# Patient Record
Sex: FEMALE | Race: WHITE | NOT HISPANIC OR LATINO | Employment: UNEMPLOYED | URBAN - METROPOLITAN AREA
[De-identification: names, ages, dates, MRNs, and addresses within clinical notes are randomized per-mention and may not be internally consistent; named-entity substitution may affect disease eponyms.]

---

## 2021-04-12 ENCOUNTER — HOSPITAL ENCOUNTER (EMERGENCY)
Facility: HOSPITAL | Age: 46
Discharge: HOME | End: 2021-04-12
Attending: EMERGENCY MEDICINE

## 2021-04-12 VITALS
RESPIRATION RATE: 20 BRPM | BODY MASS INDEX: 22.5 KG/M2 | TEMPERATURE: 98 F | OXYGEN SATURATION: 100 % | SYSTOLIC BLOOD PRESSURE: 121 MMHG | HEART RATE: 91 BPM | DIASTOLIC BLOOD PRESSURE: 60 MMHG | WEIGHT: 140 LBS | HEIGHT: 66 IN

## 2021-04-12 DIAGNOSIS — Z77.098 EXPOSURE TO CHEMICAL INHALATION: Primary | ICD-10-CM

## 2021-04-12 DIAGNOSIS — F43.0 STRESS REACTION: ICD-10-CM

## 2021-04-12 DIAGNOSIS — R00.0 TACHYCARDIA: ICD-10-CM

## 2021-04-12 LAB
ALBUMIN SERPL BCP-MCNC: 4 G/DL (ref 3.5–5.2)
ALP SERPL-CCNC: 69 U/L (ref 55–135)
ALT SERPL W/O P-5'-P-CCNC: 21 U/L (ref 10–44)
AMPHET+METHAMPHET UR QL: NORMAL
ANION GAP SERPL CALC-SCNC: 11 MMOL/L (ref 8–16)
AST SERPL-CCNC: 20 U/L (ref 10–40)
BARBITURATES UR QL SCN>200 NG/ML: NEGATIVE
BASOPHILS # BLD AUTO: 0.02 K/UL (ref 0–0.2)
BASOPHILS NFR BLD: 0.3 % (ref 0–1.9)
BENZODIAZ UR QL SCN>200 NG/ML: NEGATIVE
BILIRUB SERPL-MCNC: 0.4 MG/DL (ref 0.1–1)
BUN SERPL-MCNC: 12 MG/DL (ref 6–20)
BZE UR QL SCN: NEGATIVE
CALCIUM SERPL-MCNC: 9.7 MG/DL (ref 8.7–10.5)
CANNABINOIDS UR QL SCN: NEGATIVE
CARBOXYHEMOGLOBIN: 3.9 % (ref 1.5–5)
CHLORIDE SERPL-SCNC: 106 MMOL/L (ref 95–110)
CO2 SERPL-SCNC: 26 MMOL/L (ref 23–29)
CREAT SERPL-MCNC: 0.8 MG/DL (ref 0.5–1.4)
CREAT UR-MCNC: 151 MG/DL (ref 15–325)
DIFFERENTIAL METHOD: ABNORMAL
EOSINOPHIL # BLD AUTO: 0.2 K/UL (ref 0–0.5)
EOSINOPHIL NFR BLD: 2.6 % (ref 0–8)
ERYTHROCYTE [DISTWIDTH] IN BLOOD BY AUTOMATED COUNT: 12.9 % (ref 11.5–14.5)
EST. GFR  (AFRICAN AMERICAN): >60 ML/MIN/1.73 M^2
EST. GFR  (NON AFRICAN AMERICAN): >60 ML/MIN/1.73 M^2
GLUCOSE SERPL-MCNC: 99 MG/DL (ref 70–110)
HCT VFR BLD AUTO: 42.2 % (ref 37–48.5)
HGB BLD-MCNC: 13.5 G/DL (ref 12–16)
IMM GRANULOCYTES # BLD AUTO: 0.02 K/UL (ref 0–0.04)
IMM GRANULOCYTES NFR BLD AUTO: 0.3 % (ref 0–0.5)
LYMPHOCYTES # BLD AUTO: 1 K/UL (ref 1–4.8)
LYMPHOCYTES NFR BLD: 14 % (ref 18–48)
MCH RBC QN AUTO: 29.3 PG (ref 27–31)
MCHC RBC AUTO-ENTMCNC: 32 G/DL (ref 32–36)
MCV RBC AUTO: 92 FL (ref 82–98)
METHADONE UR QL SCN>300 NG/ML: NEGATIVE
MONOCYTES # BLD AUTO: 0.3 K/UL (ref 0.3–1)
MONOCYTES NFR BLD: 5 % (ref 4–15)
NEUTROPHILS # BLD AUTO: 5.3 K/UL (ref 1.8–7.7)
NEUTROPHILS NFR BLD: 77.8 % (ref 38–73)
NRBC BLD-RTO: 0 /100 WBC
OPIATES UR QL SCN: NEGATIVE
PCP UR QL SCN>25 NG/ML: NEGATIVE
PLATELET # BLD AUTO: 270 K/UL (ref 150–450)
PMV BLD AUTO: 10.3 FL (ref 9.2–12.9)
POTASSIUM SERPL-SCNC: 3.7 MMOL/L (ref 3.5–5.1)
PROT SERPL-MCNC: 7.5 G/DL (ref 6–8.4)
RBC # BLD AUTO: 4.61 M/UL (ref 4–5.4)
SODIUM SERPL-SCNC: 143 MMOL/L (ref 136–145)
TOXICOLOGY INFORMATION: NORMAL
TSH SERPL DL<=0.005 MIU/L-ACNC: 0.86 UIU/ML (ref 0.4–4)
WBC # BLD AUTO: 6.86 K/UL (ref 3.9–12.7)

## 2021-04-12 PROCEDURE — 99284 EMERGENCY DEPT VISIT MOD MDM: CPT | Mod: 25

## 2021-04-12 PROCEDURE — 36415 COLL VENOUS BLD VENIPUNCTURE: CPT | Performed by: EMERGENCY MEDICINE

## 2021-04-12 PROCEDURE — 85025 COMPLETE CBC W/AUTO DIFF WBC: CPT | Performed by: EMERGENCY MEDICINE

## 2021-04-12 PROCEDURE — 93010 EKG 12-LEAD: ICD-10-PCS | Mod: ,,, | Performed by: INTERNAL MEDICINE

## 2021-04-12 PROCEDURE — 93005 ELECTROCARDIOGRAM TRACING: CPT

## 2021-04-12 PROCEDURE — 99900035 HC TECH TIME PER 15 MIN (STAT)

## 2021-04-12 PROCEDURE — 93010 ELECTROCARDIOGRAM REPORT: CPT | Mod: ,,, | Performed by: INTERNAL MEDICINE

## 2021-04-12 PROCEDURE — 84443 ASSAY THYROID STIM HORMONE: CPT | Performed by: EMERGENCY MEDICINE

## 2021-04-12 PROCEDURE — 80307 DRUG TEST PRSMV CHEM ANLYZR: CPT | Performed by: EMERGENCY MEDICINE

## 2021-04-12 PROCEDURE — 82375 ASSAY CARBOXYHB QUANT: CPT

## 2021-04-12 PROCEDURE — 36600 WITHDRAWAL OF ARTERIAL BLOOD: CPT

## 2021-04-12 PROCEDURE — 80053 COMPREHEN METABOLIC PANEL: CPT | Performed by: EMERGENCY MEDICINE

## 2022-12-03 ENCOUNTER — OFFICE VISIT (OUTPATIENT)
Dept: URGENT CARE | Facility: CLINIC | Age: 47
End: 2022-12-03
Payer: MEDICAID

## 2022-12-03 VITALS
RESPIRATION RATE: 20 BRPM | HEIGHT: 66 IN | DIASTOLIC BLOOD PRESSURE: 59 MMHG | BODY MASS INDEX: 28.34 KG/M2 | TEMPERATURE: 98 F | SYSTOLIC BLOOD PRESSURE: 89 MMHG | OXYGEN SATURATION: 97 % | HEART RATE: 85 BPM | WEIGHT: 176.38 LBS

## 2022-12-03 DIAGNOSIS — N89.8 VAGINAL DISCHARGE: ICD-10-CM

## 2022-12-03 DIAGNOSIS — R30.0 DYSURIA: ICD-10-CM

## 2022-12-03 DIAGNOSIS — R35.0 FREQUENCY OF URINATION: Primary | ICD-10-CM

## 2022-12-03 LAB
BILIRUB UR QL STRIP: NEGATIVE
GLUCOSE UR QL STRIP: NEGATIVE
KETONES UR QL STRIP: NEGATIVE
LEUKOCYTE ESTERASE UR QL STRIP: POSITIVE
PH, POC UA: 6
POC BLOOD, URINE: POSITIVE
POC NITRATES, URINE: NEGATIVE
PROT UR QL STRIP: POSITIVE
SP GR UR STRIP: 1.01 (ref 1–1.03)
UROBILINOGEN UR STRIP-ACNC: 4 (ref 0.1–1.1)

## 2022-12-03 PROCEDURE — 1160F PR REVIEW ALL MEDS BY PRESCRIBER/CLIN PHARMACIST DOCUMENTED: ICD-10-PCS | Mod: CPTII,S$GLB,, | Performed by: NURSE PRACTITIONER

## 2022-12-03 PROCEDURE — 99204 PR OFFICE/OUTPT VISIT, NEW, LEVL IV, 45-59 MIN: ICD-10-PCS | Mod: S$GLB,,, | Performed by: NURSE PRACTITIONER

## 2022-12-03 PROCEDURE — 81003 URINALYSIS AUTO W/O SCOPE: CPT | Mod: QW,S$GLB,, | Performed by: NURSE PRACTITIONER

## 2022-12-03 PROCEDURE — 3008F BODY MASS INDEX DOCD: CPT | Mod: CPTII,S$GLB,, | Performed by: NURSE PRACTITIONER

## 2022-12-03 PROCEDURE — 1160F RVW MEDS BY RX/DR IN RCRD: CPT | Mod: CPTII,S$GLB,, | Performed by: NURSE PRACTITIONER

## 2022-12-03 PROCEDURE — 3078F PR MOST RECENT DIASTOLIC BLOOD PRESSURE < 80 MM HG: ICD-10-PCS | Mod: CPTII,S$GLB,, | Performed by: NURSE PRACTITIONER

## 2022-12-03 PROCEDURE — 3078F DIAST BP <80 MM HG: CPT | Mod: CPTII,S$GLB,, | Performed by: NURSE PRACTITIONER

## 2022-12-03 PROCEDURE — 1159F PR MEDICATION LIST DOCUMENTED IN MEDICAL RECORD: ICD-10-PCS | Mod: CPTII,S$GLB,, | Performed by: NURSE PRACTITIONER

## 2022-12-03 PROCEDURE — 1159F MED LIST DOCD IN RCRD: CPT | Mod: CPTII,S$GLB,, | Performed by: NURSE PRACTITIONER

## 2022-12-03 PROCEDURE — 3008F PR BODY MASS INDEX (BMI) DOCUMENTED: ICD-10-PCS | Mod: CPTII,S$GLB,, | Performed by: NURSE PRACTITIONER

## 2022-12-03 PROCEDURE — 99204 OFFICE O/P NEW MOD 45 MIN: CPT | Mod: S$GLB,,, | Performed by: NURSE PRACTITIONER

## 2022-12-03 PROCEDURE — 3074F SYST BP LT 130 MM HG: CPT | Mod: CPTII,S$GLB,, | Performed by: NURSE PRACTITIONER

## 2022-12-03 PROCEDURE — 81003 POCT URINALYSIS, DIPSTICK, AUTOMATED, W/O SCOPE: ICD-10-PCS | Mod: QW,S$GLB,, | Performed by: NURSE PRACTITIONER

## 2022-12-03 PROCEDURE — 3074F PR MOST RECENT SYSTOLIC BLOOD PRESSURE < 130 MM HG: ICD-10-PCS | Mod: CPTII,S$GLB,, | Performed by: NURSE PRACTITIONER

## 2022-12-03 RX ORDER — CEPHALEXIN 500 MG/1
500 CAPSULE ORAL EVERY 12 HOURS
Qty: 14 CAPSULE | Refills: 0 | Status: SHIPPED | OUTPATIENT
Start: 2022-12-03 | End: 2022-12-10

## 2022-12-03 RX ORDER — METRONIDAZOLE 7.5 MG/G
1 GEL VAGINAL 2 TIMES DAILY
Qty: 70 G | Refills: 0 | Status: SHIPPED | OUTPATIENT
Start: 2022-12-03 | End: 2022-12-08

## 2022-12-03 RX ORDER — FLUCONAZOLE 100 MG/1
100 TABLET ORAL EVERY OTHER DAY
Qty: 3 TABLET | Refills: 0 | Status: SHIPPED | OUTPATIENT
Start: 2022-12-03

## 2022-12-03 NOTE — PATIENT INSTRUCTIONS
Cephalexin 500mg twice daily x 7 days  Fluconazole 100mg every other day  Montronidazole gel Place 1 applicator vaginally twice daily x 7 days  A Nuswab was collected and sent to the lab. We will call you with the results  Follow up if your symptoms persist or worsen

## 2022-12-03 NOTE — PROGRESS NOTES
"Subjective:       Patient ID: Bertha Prather is a 47 y.o. female.    Vitals:  height is 5' 6" (1.676 m) and weight is 80 kg (176 lb 6.4 oz). Her temperature is 97.9 °F (36.6 °C). Her blood pressure is 89/59 (abnormal) and her pulse is 85. Her respiration is 20 and oxygen saturation is 97%.     Chief Complaint: Urinary Tract Infection    47 year old female wit c/o burning with urination, vaginal discharge and odor x 1 month. She was seen by her Ob/gyn and give prescriptions for medications that she did not get filled.     Urinary Tract Infection   The current episode started more than 1 month ago. The problem has been waxing and waning. The quality of the pain is described as aching and burning. The pain is mild. Associated symptoms include a discharge, frequency and urgency. Associated symptoms comments: odor.     Genitourinary:  Positive for frequency and urgency.     Objective:      Physical Exam   Constitutional: She is oriented to person, place, and time.   HENT:   Head: Normocephalic and atraumatic.   Nose: Nose normal.   Mouth/Throat: Mucous membranes are moist.   Eyes: Conjunctivae are normal. Extraocular movement intact   Neck: Neck supple.   Cardiovascular: Normal rate, regular rhythm, normal heart sounds and normal pulses.   Pulmonary/Chest: Effort normal and breath sounds normal.   Abdominal: Normal appearance. Soft.   Musculoskeletal: Normal range of motion.         General: Normal range of motion.   Neurological: She is alert and oriented to person, place, and time.   Skin: Skin is warm and dry. Capillary refill takes 2 to 3 seconds.   Psychiatric: Her behavior is normal. Mood normal.   Nursing note and vitals reviewed.      Assessment:       1. Frequency of urination    2. Vaginal discharge    3. Dysuria      UA: Leukocytes +, blood+  (menstruating)   Nuswab: Pending    Plan:         Frequency of urination  -     POCT Urinalysis, Dipstick, Automated, W/O Scope    Vaginal discharge  -     NuSwab " Vaginitis Plus (VG+)  -     fluconazole (DIFLUCAN) 100 MG tablet; Take 1 tablet (100 mg total) by mouth every other day.  Dispense: 3 tablet; Refill: 0  -     metroNIDAZOLE (METROGEL) 0.75 % (37.5mg/5 gram) vaginal gel; Place 1 applicator vaginally 2 (two) times daily. for 7 days  Dispense: 70 g; Refill: 0    Dysuria  -     cephALEXin (KEFLEX) 500 MG capsule; Take 1 capsule (500 mg total) by mouth every 12 (twelve) hours. for 7 days  Dispense: 14 capsule; Refill: 0       Cephalexin 500mg twice daily x 7 days  Fluconazole 100mg every other day  Montronidazole gel Place 1 applicator vaginally twice daily x 7 days  A Nuswab was collected and sent to the lab. We will call you with the results  Follow up if your symptoms persist or worsen

## 2022-12-06 ENCOUNTER — HOSPITAL ENCOUNTER (EMERGENCY)
Facility: HOSPITAL | Age: 47
Discharge: HOME | End: 2022-12-06
Attending: EMERGENCY MEDICINE
Payer: MEDICAID

## 2022-12-06 ENCOUNTER — OFFICE VISIT (OUTPATIENT)
Dept: URGENT CARE | Facility: CLINIC | Age: 47
End: 2022-12-06
Payer: MEDICAID

## 2022-12-06 VITALS
HEART RATE: 85 BPM | SYSTOLIC BLOOD PRESSURE: 121 MMHG | DIASTOLIC BLOOD PRESSURE: 85 MMHG | WEIGHT: 179.63 LBS | HEIGHT: 66 IN | OXYGEN SATURATION: 98 % | TEMPERATURE: 98 F | BODY MASS INDEX: 28.87 KG/M2 | RESPIRATION RATE: 18 BRPM

## 2022-12-06 VITALS
TEMPERATURE: 99 F | RESPIRATION RATE: 18 BRPM | DIASTOLIC BLOOD PRESSURE: 69 MMHG | SYSTOLIC BLOOD PRESSURE: 115 MMHG | WEIGHT: 179 LBS | BODY MASS INDEX: 28.77 KG/M2 | HEART RATE: 83 BPM | HEIGHT: 66 IN | OXYGEN SATURATION: 99 %

## 2022-12-06 DIAGNOSIS — S09.90XA TRAUMATIC INJURY OF HEAD, INITIAL ENCOUNTER: Primary | ICD-10-CM

## 2022-12-06 LAB
HCV AB SERPL QL IA: NORMAL
HIV 1+2 AB+HIV1 P24 AG SERPL QL IA: NORMAL

## 2022-12-06 PROCEDURE — 1160F RVW MEDS BY RX/DR IN RCRD: CPT | Mod: CPTII,S$GLB,, | Performed by: NURSE PRACTITIONER

## 2022-12-06 PROCEDURE — 3074F SYST BP LT 130 MM HG: CPT | Mod: CPTII,S$GLB,, | Performed by: NURSE PRACTITIONER

## 2022-12-06 PROCEDURE — 99213 PR OFFICE/OUTPT VISIT, EST, LEVL III, 20-29 MIN: ICD-10-PCS | Mod: S$GLB,,, | Performed by: NURSE PRACTITIONER

## 2022-12-06 PROCEDURE — 1160F PR REVIEW ALL MEDS BY PRESCRIBER/CLIN PHARMACIST DOCUMENTED: ICD-10-PCS | Mod: CPTII,S$GLB,, | Performed by: NURSE PRACTITIONER

## 2022-12-06 PROCEDURE — 86803 HEPATITIS C AB TEST: CPT | Performed by: EMERGENCY MEDICINE

## 2022-12-06 PROCEDURE — 3079F PR MOST RECENT DIASTOLIC BLOOD PRESSURE 80-89 MM HG: ICD-10-PCS | Mod: CPTII,S$GLB,, | Performed by: NURSE PRACTITIONER

## 2022-12-06 PROCEDURE — 3008F BODY MASS INDEX DOCD: CPT | Mod: CPTII,S$GLB,, | Performed by: NURSE PRACTITIONER

## 2022-12-06 PROCEDURE — 3008F PR BODY MASS INDEX (BMI) DOCUMENTED: ICD-10-PCS | Mod: CPTII,S$GLB,, | Performed by: NURSE PRACTITIONER

## 2022-12-06 PROCEDURE — 99284 EMERGENCY DEPT VISIT MOD MDM: CPT | Mod: 25

## 2022-12-06 PROCEDURE — 3079F DIAST BP 80-89 MM HG: CPT | Mod: CPTII,S$GLB,, | Performed by: NURSE PRACTITIONER

## 2022-12-06 PROCEDURE — 99213 OFFICE O/P EST LOW 20 MIN: CPT | Mod: S$GLB,,, | Performed by: NURSE PRACTITIONER

## 2022-12-06 PROCEDURE — 1159F MED LIST DOCD IN RCRD: CPT | Mod: CPTII,S$GLB,, | Performed by: NURSE PRACTITIONER

## 2022-12-06 PROCEDURE — 3074F PR MOST RECENT SYSTOLIC BLOOD PRESSURE < 130 MM HG: ICD-10-PCS | Mod: CPTII,S$GLB,, | Performed by: NURSE PRACTITIONER

## 2022-12-06 PROCEDURE — 1159F PR MEDICATION LIST DOCUMENTED IN MEDICAL RECORD: ICD-10-PCS | Mod: CPTII,S$GLB,, | Performed by: NURSE PRACTITIONER

## 2022-12-06 PROCEDURE — 87389 HIV-1 AG W/HIV-1&-2 AB AG IA: CPT | Performed by: EMERGENCY MEDICINE

## 2022-12-06 PROCEDURE — 36415 COLL VENOUS BLD VENIPUNCTURE: CPT | Performed by: EMERGENCY MEDICINE

## 2022-12-06 NOTE — ED PROVIDER NOTES
"Encounter Date: 12/6/2022    SCRIBE #1 NOTE: I, Mari Meléndez, am scribing for, and in the presence of,  Saman Fuchs MD.     History     Chief Complaint   Patient presents with    Headache     Hit in head by metal object 3 hours ago, denies LOC     Time seen by provider: 3:12 PM on 12/06/2022    Bertha Prather is a 47 y.o. female with a PMHx of back problems who presents to the ED for evaluation of a persistent HA that onset 3 hours ago while cleaning.  Patient reports moving a box when a "screwdriver-like" object hit her on the upper medial forehead.  Since the injury the patient reports feeling disoriented with associated blurry vision, nausea and tinnitus.  She notes a wound to the mid-hairline that was bleeding, but has since achieved hemostasis.  The patient denies vomiting or any other symptoms at this time.  No known PSHx.      The history is provided by the patient.   Review of patient's allergies indicates:  No Known Allergies  Past Medical History:   Diagnosis Date    Back problem      No past surgical history on file.  No family history on file.  Social History     Tobacco Use    Smoking status: Every Day   Substance Use Topics    Alcohol use: Yes     Review of Systems   Constitutional:  Negative for activity change, diaphoresis and fever.   HENT:  Positive for tinnitus. Negative for ear pain, rhinorrhea, sore throat and trouble swallowing.    Eyes:  Positive for visual disturbance (blurry). Negative for pain.   Respiratory:  Negative for cough, shortness of breath and stridor.    Cardiovascular:  Negative for chest pain.   Gastrointestinal:  Positive for nausea. Negative for abdominal pain, blood in stool, diarrhea and vomiting.   Genitourinary:  Negative for dysuria, hematuria, vaginal bleeding and vaginal discharge.   Musculoskeletal:  Negative for gait problem.   Skin:  Positive for wound. Negative for rash.   Neurological:  Positive for headaches. Negative for seizures.   Psychiatric/Behavioral:  " Positive for confusion (disoriented). Negative for hallucinations and suicidal ideas.      Physical Exam     Initial Vitals [12/06/22 1458]   BP Pulse Resp Temp SpO2   112/64 82 18 98.6 °F (37 °C) 100 %      MAP       --         Physical Exam    Nursing note and vitals reviewed.  Constitutional: She appears well-developed. She is not diaphoretic. No distress.   HENT:   Head: Normocephalic. Head is with abrasion. Head is without raccoon's eyes and without Pope's sign.   Right Ear: No hemotympanum.   Left Ear: No hemotympanum.   Nose: Nose normal.   Dry blood over a small superficial abrasion to the top scalp.  Small hematoma with mild tenderness to palpation.     Eyes: EOM are normal. Pupils are equal, round, and reactive to light. No scleral icterus.   Neck: Neck supple.   Normal range of motion.  Cardiovascular:  Normal rate, regular rhythm, normal heart sounds and intact distal pulses.     Exam reveals no gallop and no friction rub.       No murmur heard.  Pulmonary/Chest: Breath sounds normal. No stridor. No respiratory distress. She has no wheezes. She has no rhonchi. She has no rales.   Abdominal: Abdomen is soft. Bowel sounds are normal. She exhibits no distension. There is no abdominal tenderness. There is no rebound and no guarding.   Musculoskeletal:         General: Normal range of motion.      Cervical back: Normal range of motion and neck supple.     Neurological: She is alert and oriented to person, place, and time. She has normal strength. No cranial nerve deficit or sensory deficit.   Cranial nerves II-XII grossly intact. Finger-to-nose normal. Tone normal. Sensation intact to light touch. No drift. No disdiadochokinesia. 5/5 BUE and BLE strength. Normal gait. Negative Romberg. Speech and cognition is normal. No focal neurologic deficit.   Skin: Skin is warm and dry. Capillary refill takes less than 2 seconds. No rash noted.   Psychiatric: She has a normal mood and affect.       ED Course    Procedures  Labs Reviewed   HIV 1 / 2 ANTIBODY    Narrative:     Release to patient->Immediate   HEPATITIS C ANTIBODY    Narrative:     Release to patient->Immediate          Imaging Results              CT Head Without Contrast (Final result)  Result time 12/06/22 16:15:57      Final result by Petrona Cooper MD (12/06/22 16:15:57)                   Impression:      No acute intracranial findings.      Electronically signed by: Petrona Cooper MD  Date:    12/06/2022  Time:    16:15               Narrative:    EXAMINATION:  CT HEAD WITHOUT CONTRAST    CLINICAL HISTORY:  Head trauma, moderate-severe;    TECHNIQUE:  Low dose axial images were obtained through the head.  Coronal and sagittal reformations were also performed. Contrast was not administered.    COMPARISON:  None.    FINDINGS:  Ventricles, sulci, fissure, white matter are unremarkable in appearance for the patient's age. There is no acute intracranial hemorrhage.  There is no intracranial mass effect.  There is no acute major vascular territory infarct. Note is made that MRI is typically more sensitive than CT particular for detection of early or small nonhemorrhagic infarct. The calvarium appears intact, mastoids well pneumatized, visualized paranasal sinuses appear clear of air-fluid levels.  Mucosal thickening in sphenoid sinus.                                       Medications - No data to display  Medical Decision Making:   History:   Old Medical Records: I decided to obtain old medical records.  Clinical Tests:   Lab Tests: Ordered and Reviewed  Radiological Study: Ordered and Reviewed  ED Management:  Patient is awake and alert at this time. There is a normal neurologic examination. There was minor trauma without signs of a depressed skull fracture or basilar skull fracture. There has not been repeated vomiting. We discussed options of CT and both the benefit of diagnosis and risk of radiation exposure. The patient and I elected to undergo CT  at this time. The CT reveals no acute skull fracture or intracranial hemorrhage. We discussed the need to return immediately to the ED if new symptoms develop including worse headache, confusion, vomiting or new symptoms. The patient is aware and agreeable with this plan.            Scribe Attestation:   Scribe #1: I performed the above scribed service and the documentation accurately describes the services I performed. I attest to the accuracy of the note.      ED Course as of 12/07/22 1625   Tue Dec 06, 2022   1651 CT Head Without Contrast [BD]   1651    Impression:     No acute intracranial findings.        Electronically signed by: Petrona Cooper MD  Date:                                            12/06/2022  Time:                                           16:15 [BD]      ED Course User Index  [BD] Saman Fuchs MD                 Attending Attestation:     Physician Attestation for Scribe:    I, Dr. Saman Fuchs, personally performed the services described in this documentation.   All medical record entries made by the scribe were at my direction and in my presence.   I have reviewed the chart and agree that the record is accurate and complete.   Saman Fuchs MD  4:25 PM 12/07/2022     DISCLAIMER: This note was prepared with [employer] voice recognition transcription software. Garbled syntax, mangled pronouns, and other bizarre constructions may be attributed to that software system.      Clinical Impression:   Final diagnoses:  [S09.90XA] Traumatic injury of head, initial encounter (Primary)      ED Disposition Condition    Discharge Stable          ED Prescriptions    None       Follow-up Information       Follow up With Specialties Details Why Contact [facility]  Go in 1 day  [facility] [place] [address]  [phone]      [facility] Emergency Dept Emergency Medicine Go to  As needed, If symptoms worsen [address] [place] [address]  Drive  [facility] [address]  [phone]             Saman Fuchs MD  12/07/22 4601

## 2022-12-06 NOTE — PROGRESS NOTES
"Subjective:       Patient ID: Bertha Prather is a 47 y.o. female.    Vitals:  height is 5' 6" (1.676 m) and weight is 81.5 kg (179 lb 9.6 oz). Her temperature is 97.6 °F (36.4 °C). Her blood pressure is 121/85 and her pulse is 85. Her respiration is 18 and oxygen saturation is 98%.     Chief Complaint: Head Injury    47 year old female with c/o direct hit in the head by a metal anchor while changing a trash bag. She did not lose consciousness. She began bleeding from a wound from the hit. She cleaned the wound and continued with her activities. After an hour she developed blurred vision, lightheadedness, disorientation and ringing in her ears.     Head Injury   The incident occurred 1 to 3 hours ago. The injury mechanism was a direct blow. There was no loss of consciousness. The volume of blood lost was minimal. The patient is experiencing no pain. Associated symptoms include blurred vision, disorientation, numbness and tinnitus. She has tried nothing for the symptoms.     HENT:  Positive for tinnitus.    Eyes:  Positive for blurred vision.   Neurological:  Positive for disorientation and numbness.   Psychiatric/Behavioral:  Positive for disorientation.      Objective:      Physical Exam   Constitutional: She is oriented to person, place, and time.   HENT:   Head: Normocephalic and atraumatic.   Nose: Nose normal.   Mouth/Throat: Mucous membranes are moist.   Eyes: Conjunctivae and lids are normal. Pupils are equal, round, and reactive to light. Extraocular movement intact   Neck: Neck supple.   Cardiovascular: Normal rate, regular rhythm and normal heart sounds.   Pulmonary/Chest: Effort normal.   Abdominal: Normal appearance. Soft.   Musculoskeletal: Normal range of motion.         General: Normal range of motion.   Neurological: She is alert and oriented to person, place, and time.   Skin: Capillary refill takes 2 to 3 seconds.        Psychiatric: Her behavior is normal. Mood normal.   Nursing note and vitals " reviewed.      Assessment:       1. Traumatic injury of head, initial encounter          Plan:         Traumatic injury of head, initial encounter       I discussed exam findings and need for further evaluation due to her symptom presentation. She agreed to go to [facility] for further evaluation. She decline ambulance transportation and will driven by a friend.

## 2022-12-07 LAB
A VAGINAE DNA VAG QL NAA+PROBE: ABNORMAL SCORE
BVAB2 DNA VAG QL NAA+PROBE: ABNORMAL SCORE
C ALBICANS DNA VAG QL NAA+PROBE: NEGATIVE
C GLABRATA DNA VAG QL NAA+PROBE: NEGATIVE
C TRACH DNA VAG QL NAA+PROBE: NEGATIVE
MEGA1 DNA VAG QL NAA+PROBE: ABNORMAL SCORE
N GONORRHOEA DNA VAG QL NAA+PROBE: NEGATIVE
T VAGINALIS DNA VAG QL NAA+PROBE: POSITIVE

## 2022-12-08 DIAGNOSIS — A59.9 TRICHOMONAS INFECTION: Primary | ICD-10-CM

## 2022-12-08 RX ORDER — METRONIDAZOLE 500 MG/1
500 TABLET ORAL EVERY 12 HOURS
Qty: 14 TABLET | Refills: 0 | Status: SHIPPED | OUTPATIENT
Start: 2022-12-08 | End: 2022-12-15

## 2022-12-09 ENCOUNTER — TELEPHONE (OUTPATIENT)
Dept: URGENT CARE | Facility: CLINIC | Age: 47
End: 2022-12-09
Payer: MEDICAID

## 2022-12-09 NOTE — TELEPHONE ENCOUNTER
Pt was notified of results and showed understanding.----- Message from Alethea Mcrae NP sent at 12/8/2022  7:16 PM CST -----  Please inform patient that her vaginal swab did not come back with the bacterial vaginosis however was positive for Trichomonas.  The treatment for Trichomonas is Flagyl that needs to be taken by mouth so the Metrogel that she has been using vaginally will not be adequate to treat this infection.  Prescription for Flagyl was sent to the pharmacy on file.  Please inform her that this is a sexually transmitted disease in that all sexual contacts need to be contacted so that they also may be tested and treated for this infection

## 2023-06-02 ENCOUNTER — HOSPITAL ENCOUNTER (EMERGENCY)
Facility: HOSPITAL | Age: 48
Discharge: HOME | End: 2023-06-02
Attending: EMERGENCY MEDICINE
Payer: MEDICAID

## 2023-06-02 VITALS
RESPIRATION RATE: 20 BRPM | BODY MASS INDEX: 25.23 KG/M2 | HEART RATE: 95 BPM | SYSTOLIC BLOOD PRESSURE: 128 MMHG | OXYGEN SATURATION: 100 % | WEIGHT: 157 LBS | HEIGHT: 66 IN | TEMPERATURE: 97 F | DIASTOLIC BLOOD PRESSURE: 75 MMHG

## 2023-06-02 DIAGNOSIS — M25.511 SHOULDER PAIN, RIGHT: Primary | ICD-10-CM

## 2023-06-02 DIAGNOSIS — S46.911A SHOULDER STRAIN, RIGHT, INITIAL ENCOUNTER: ICD-10-CM

## 2023-06-02 PROCEDURE — 63600175 PHARM REV CODE 636 W HCPCS: Performed by: EMERGENCY MEDICINE

## 2023-06-02 PROCEDURE — 99284 EMERGENCY DEPT VISIT MOD MDM: CPT

## 2023-06-02 PROCEDURE — 96372 THER/PROPH/DIAG INJ SC/IM: CPT | Performed by: EMERGENCY MEDICINE

## 2023-06-02 RX ORDER — KETOROLAC TROMETHAMINE 10 MG/1
10 TABLET, FILM COATED ORAL EVERY 6 HOURS PRN
Qty: 20 TABLET | Refills: 0 | Status: SHIPPED | OUTPATIENT
Start: 2023-06-02 | End: 2023-06-07

## 2023-06-02 RX ORDER — KETOROLAC TROMETHAMINE 30 MG/ML
15 INJECTION, SOLUTION INTRAMUSCULAR; INTRAVENOUS
Status: COMPLETED | OUTPATIENT
Start: 2023-06-02 | End: 2023-06-02

## 2023-06-02 RX ORDER — LISDEXAMFETAMINE DIMESYLATE CAPSULES 20 MG/1
20 CAPSULE ORAL EVERY MORNING
COMMUNITY

## 2023-06-02 RX ADMIN — KETOROLAC TROMETHAMINE 15 MG: 30 INJECTION, SOLUTION INTRAMUSCULAR; INTRAVENOUS at 07:06

## 2023-06-02 NOTE — ED PROVIDER NOTES
Encounter Date: 6/2/2023    SCRIBE #1 NOTE: I, Denita Ponce, am scribing for, and in the presence of,  Loulou Dempsey MD.     History     Chief Complaint   Patient presents with    Arm Injury     I think I tore my rotator cuff weeks ago; I took 4 advil around 430 this morning     Time seen by provider: 7:38 AM on 06/02/2023    Bertha Prather is a 48 y.o. female who presents to the ED with an onset of worsening right shoulder pain that began on 04/19/2023. 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 She notes pain exacerbates with movement of the arm. Patient reports taking 4 Advil at 4:30 AM this morning which did not improve Sx. No Sx are denied at this time. No pertinent PMHx or PSHx.        The history is provided by the patient.   Review of patient's allergies indicates:  No Known Allergies  Past Medical History:   Diagnosis Date    Back problem      No past surgical history on file.  No family history on file.  Social History     Tobacco Use    Smoking status: Every Day   Substance Use Topics    Alcohol use: Yes     Review of Systems   Constitutional:  Negative for fever.   Respiratory:  Negative for shortness of breath.    Genitourinary:  Negative for flank pain.   Musculoskeletal:  Positive for arthralgias. Negative for gait problem.   Neurological:  Negative for weakness.   Psychiatric/Behavioral:  Negative for confusion.    All other systems reviewed and are negative.    Physical Exam     Initial Vitals [06/02/23 0715]   BP Pulse Resp Temp SpO2   126/64 95 20 97 °F (36.1 °C) 100 %      MAP       --         Physical Exam    Nursing note and vitals reviewed.  Constitutional: She appears well-developed and well-nourished. She is not diaphoretic. No distress.   HENT:   Head: Normocephalic and atraumatic.   Eyes: EOM are normal. Pupils are equal, round, and reactive to light.   Neck: Neck supple.   Normal range of motion.  Cardiovascular:  Normal  rate, regular rhythm, normal heart sounds and intact distal pulses.     Exam reveals no gallop and no friction rub.       No murmur heard.  Pulmonary/Chest: Breath sounds normal. No respiratory distress. She has no wheezes. She has no rhonchi. She has no rales.   Abdominal: Abdomen is soft. Bowel sounds are normal. There is no abdominal tenderness. There is no rebound and no guarding.   Musculoskeletal:         General: Normal range of motion.      Cervical back: Normal range of motion and neck supple.      Comments: Old bruise noted to the lateral aspect of the right upper arm. Tenderness present to the right shoulder on exam. Decreased ROM of the right shoulder noted, but the patient can range it.      Neurological: She is alert and oriented to person, place, and time.   Skin: Skin is warm and dry.   Psychiatric: She has a normal mood and affect. Her behavior is normal. Judgment and thought content normal.       ED Course   Procedures  Labs Reviewed - No data to display       Imaging Results              X-Ray Shoulder Trauma Right (Final result)  Result time 06/02/23 08:24:59      Final result by Casimiro Thompson Jr., MD (06/02/23 08:24:59)                   Impression:      Negative shoulder radiographs.      Electronically signed by: Casimiro Thompson MD  Date:    06/02/2023  Time:    08:24               Narrative:    EXAMINATION:  XR SHOULDER TRAUMA 3 VIEW RIGHT    CLINICAL HISTORY:  Pain in right shoulder    TECHNIQUE:  2 or more views of the shoulder are obtained.    COMPARISON:  None.    FINDINGS:  No dislocation is seen.  A fracture of the scapula, humerus or clavicle is not seen.  The acromioclavicular and glenohumeral joints are within normal limits.                                       Medications   ketorolac injection 15 mg (15 mg Intramuscular Given 6/2/23 9303)     Medical Decision Making:   History:   Old Medical Records: I decided to obtain old medical records.  Initial Assessment:   40-year-old  female presented with shoulder pain.  Differential Diagnosis:   Initial differential diagnosis included but not limited to fracture, strain, and sprain.  Clinical Tests:   Radiological Study: Ordered and Reviewed  ED Management:  The patient was emergently evaluated in the emergency department, her evaluation was significant for a middle-age female with tenderness noted to the right shoulder.  The patient does not have a frozen shoulder noted on examination.  The patient's pain was treated with a dose of parental Toradol, with improvement in it.  The patient's x-ray showed no acute abnormalities per Radiology.  The patient may have a strain versus rotator cuff injury causing her symptoms.  The patient is stable for discharge home and does not require further workup at this time.  She will be discharged home with p.o. Toradol at her request.  She is referred to Orthopedics for follow-up.        Scribe Attestation:   Scribe #1: I performed the above scribed service and the documentation accurately describes the services I performed. I attest to the accuracy of the note.               I, Dr. Umberto Dempsey, personally performed the services described in this documentation. All medical record entries made by the scribe were at my direction and in my presence.  I have reviewed the chart and agree that the record reflects my personal performance and is accurate and complete. Umberto Dempsey MD.  2:20 PM 06/02/2023      Clinical Impression:   Final diagnoses:  [M25.511] Shoulder pain, right (Primary)  [S46.911A] Shoulder strain, right, initial encounter        ED Disposition Condition    Discharge Stable          ED Prescriptions       Medication Sig Dispense Start Date End Date Auth. Provider    ketorolac (TORADOL) 10 mg tablet Take 1 tablet (10 mg total) by mouth every 6 (six) hours as needed for Pain. 20 tablet 6/2/2023 6/7/2023 Umberto Dempsey MD          Follow-up Information       Follow up With Specialties  Details Why Contact Info    Myles Rubin MD Orthopedic Surgery Schedule an appointment as soon as possible for a visit   [address] ORTHOPEDICS & SPORTS MEDICINE  [facility] [address]  [phone]               Umberto Dempsey MD  06/02/23 2379